# Patient Record
Sex: MALE | Race: WHITE | NOT HISPANIC OR LATINO | URBAN - NONMETROPOLITAN AREA
[De-identification: names, ages, dates, MRNs, and addresses within clinical notes are randomized per-mention and may not be internally consistent; named-entity substitution may affect disease eponyms.]

---

## 2020-10-05 PROCEDURE — U0004 COV-19 TEST NON-CDC HGH THRU: HCPCS | Performed by: NURSE PRACTITIONER

## 2021-02-22 PROCEDURE — U0004 COV-19 TEST NON-CDC HGH THRU: HCPCS | Performed by: NURSE PRACTITIONER

## 2021-02-23 ENCOUNTER — TELEPHONE (OUTPATIENT)
Dept: URGENT CARE | Facility: CLINIC | Age: 21
End: 2021-02-23

## 2021-02-23 NOTE — TELEPHONE ENCOUNTER
"Spoke with patient regarding positive COVID results. He feels \"fantastic\" and has not been quarantining but states he will start and continue through 2/28/21. He agrees to inform contacts.  Agrees to follow up with PCP after quarantine and return to clinic if worsening symptoms or go to the ER if in any distress.     "

## 2021-03-02 ENCOUNTER — OFFICE VISIT (OUTPATIENT)
Dept: INTERNAL MEDICINE | Facility: CLINIC | Age: 21
End: 2021-03-02

## 2021-03-02 VITALS
WEIGHT: 235.8 LBS | HEIGHT: 72 IN | OXYGEN SATURATION: 98 % | HEART RATE: 59 BPM | BODY MASS INDEX: 31.94 KG/M2 | DIASTOLIC BLOOD PRESSURE: 65 MMHG | SYSTOLIC BLOOD PRESSURE: 117 MMHG | TEMPERATURE: 98.2 F

## 2021-03-02 DIAGNOSIS — U07.1 COVID-19: ICD-10-CM

## 2021-03-02 DIAGNOSIS — Z76.89 ENCOUNTER TO ESTABLISH CARE: Primary | ICD-10-CM

## 2021-03-02 PROCEDURE — 99212 OFFICE O/P EST SF 10 MIN: CPT | Performed by: NURSE PRACTITIONER

## 2021-03-02 NOTE — PROGRESS NOTES
"Date: 2021    Name: Floyd Carias  : 2000    Chief Complaint:   Chief Complaint   Patient presents with   • [facility]     return to [employer] note       HPI:  Floyd Carias is a 20 y.o. male presents to establish care. He was recently diagnosed with COVID 19 on 21, had been having symptoms for several days prior to testing.  Has been released from quarantine, per CDC guidelines. No symptoms at this time.  Denies fever, chills, headache, dizziness, sneezing, earache, sore throat, loss of smell or taste, nasal congestion, cough, SOA, palpitations, rash, n/v/d, myalgia.  He is active in [employer].     History:    History reviewed. No pertinent past medical history.    Past Surgical History:   Procedure Laterality Date   • NO PAST SURGERIES         History reviewed. No pertinent family history.    Social History     Socioeconomic History   • Marital status: Single     Spouse name: Not on file   • Number of children: Not on file   • Years of education: Not on file   • Highest education level: Not on file   Tobacco Use   • Smoking status: Never Smoker   • Smokeless tobacco: Never Used   Substance and Sexual Activity   • Alcohol use: Yes     Comment: occ   • Drug use: Never       Allergies   Allergen Reactions   • Penicillins Other (See Comments)     As a child       No current outpatient medications on file.      VS:  Vitals:    21 1015   BP: 117/65   Pulse: 59   Temp: 98.2 °F (36.8 °C)   TempSrc: Infrared   SpO2: 98%   Weight: 107 kg (235 lb 12.8 oz)   Height: 182.9 cm (72\")     Body mass index is 31.98 kg/m².    PE:  Physical Exam  Constitutional:       Appearance: He is well-developed. He is not ill-appearing.   HENT:      Head: Normocephalic.      Right Ear: Tympanic membrane, ear canal and external ear normal.      Left Ear: Tympanic membrane, ear canal and external ear normal.      Nose: Nose normal.      Mouth/Throat:      Mouth: Mucous membranes are moist.      Pharynx: Oropharynx is clear. " Uvula midline.   Eyes:      Extraocular Movements: Extraocular movements intact.      Conjunctiva/sclera: Conjunctivae normal.      Pupils: Pupils are equal, round, and reactive to light.   Neck:      Musculoskeletal: Full passive range of motion without pain, normal range of motion and neck supple.      Thyroid: No thyromegaly.   Cardiovascular:      Rate and Rhythm: Normal rate and regular rhythm.      Pulses: Normal pulses.      Heart sounds: Normal heart sounds.   Pulmonary:      Effort: Pulmonary effort is normal.      Breath sounds: Normal breath sounds.   Abdominal:      General: Bowel sounds are normal.      Palpations: Abdomen is soft.      Tenderness: There is no abdominal tenderness.   Lymphadenopathy:      Cervical: No cervical adenopathy.   Skin:     General: Skin is warm.      Capillary Refill: Capillary refill takes less than 2 seconds.   Neurological:      Mental Status: He is alert and oriented to person, place, and time.      Sensory: No sensory deficit.      Coordination: Coordination normal.      Gait: Gait normal.   Psychiatric:         Attention and Perception: Attention normal.         Mood and Affect: Mood and affect normal.         Speech: Speech normal.         Behavior: Behavior normal.         Thought Content: Thought content normal.         Assessment/Plan:   Diagnoses and all orders for this visit:    1. Encounter to establish care (Primary)    2. COVID-19       -  Normal HPI, PE.  Released from quarantine.  Ok to return to normal activities, including [employer].              No follow-ups on file.

## 2023-10-08 ENCOUNTER — HOSPITAL ENCOUNTER (OUTPATIENT)
Facility: HOSPITAL | Age: 23
Discharge: HOME | End: 2023-10-08
Attending: EMERGENCY MEDICINE | Admitting: EMERGENCY MEDICINE
Payer: OTHER GOVERNMENT

## 2023-10-08 VITALS
HEIGHT: 72 IN | RESPIRATION RATE: 18 BRPM | DIASTOLIC BLOOD PRESSURE: 93 MMHG | OXYGEN SATURATION: 97 % | HEART RATE: 99 BPM | BODY MASS INDEX: 32.51 KG/M2 | WEIGHT: 240 LBS | SYSTOLIC BLOOD PRESSURE: 148 MMHG | TEMPERATURE: 98.6 F

## 2023-10-08 DIAGNOSIS — T50.Z95A ADVERSE EFFECT OF VACCINE, INITIAL ENCOUNTER: Primary | ICD-10-CM

## 2023-10-08 PROCEDURE — G0463 HOSPITAL OUTPT CLINIC VISIT: HCPCS | Performed by: NURSE PRACTITIONER

## 2023-10-08 NOTE — FSED PROVIDER NOTE
Subjective   History of Present Illness  Patient is a 23-year-old male who presents complaining of headache and fatigue after getting his flu shot yesterday.  States he has had the flu shot every year and has never had symptoms afterward.  He denies any fever.      Review of Systems   Constitutional:  Positive for fatigue.   Neurological:  Positive for headaches.   All other systems reviewed and are negative.      No past medical history on file.    Allergies   Allergen Reactions    Penicillins Other (See Comments)     As a child       Past Surgical History:   Procedure Laterality Date    NO PAST SURGERIES         No family history on file.    Social History     Socioeconomic History    Marital status: Single   Tobacco Use    Smokeless tobacco: Never   Substance and Sexual Activity    Alcohol use: Yes     Comment: occ    Drug use: Never           Objective   Physical Exam  Vitals and nursing note reviewed.   Constitutional:       Appearance: Normal appearance.   HENT:      Head: Normocephalic and atraumatic.   Cardiovascular:      Rate and Rhythm: Normal rate and regular rhythm.      Pulses: Normal pulses.      Heart sounds: Normal heart sounds.   Pulmonary:      Effort: Pulmonary effort is normal.      Breath sounds: Normal breath sounds.   Musculoskeletal:      Cervical back: Normal range of motion and neck supple.   Skin:     General: Skin is warm and dry.      Capillary Refill: Capillary refill takes less than 2 seconds.   Neurological:      General: No focal deficit present.      Mental Status: He is alert and oriented to person, place, and time.         Procedures           ED Course                                           Medical Decision Making  Patient is nontoxic appearing.  Provided reassurance that his symptoms are due to the vaccine in his body mounting an immune response.  He is to follow-up with his primary care as needed, given strict return precautions.    Problems Addressed:  Adverse effect of  vaccine, initial encounter: acute illness or injury        Final diagnoses:   Adverse effect of vaccine, initial encounter       ED Disposition  ED Disposition       ED Disposition   Discharge    Condition   Stable    Comment   --               Dorcas Laurent, APRN  [address] [address] [phone]    Call   If symptoms worsen         Medication List      No changes were made to your prescriptions during this visit.